# Patient Record
Sex: MALE | Race: ASIAN | NOT HISPANIC OR LATINO | Employment: UNEMPLOYED | URBAN - METROPOLITAN AREA
[De-identification: names, ages, dates, MRNs, and addresses within clinical notes are randomized per-mention and may not be internally consistent; named-entity substitution may affect disease eponyms.]

---

## 2017-07-12 ENCOUNTER — OFFICE VISIT - HEALTHEAST (OUTPATIENT)
Dept: FAMILY MEDICINE | Facility: CLINIC | Age: 52
End: 2017-07-12

## 2017-07-12 DIAGNOSIS — R19.5 HEME POSITIVE STOOL: ICD-10-CM

## 2017-07-12 DIAGNOSIS — F43.21 ADJUSTMENT DISORDER WITH DEPRESSED MOOD: ICD-10-CM

## 2017-07-12 DIAGNOSIS — F11.20 OPIOID TYPE DEPENDENCE (H): ICD-10-CM

## 2017-07-12 DIAGNOSIS — J47.9 BRONCHIECTASIS WITHOUT COMPLICATION (H): ICD-10-CM

## 2017-07-12 DIAGNOSIS — J44.9 COPD (CHRONIC OBSTRUCTIVE PULMONARY DISEASE) (H): ICD-10-CM

## 2017-07-12 ASSESSMENT — MIFFLIN-ST. JEOR: SCORE: 1124.5

## 2017-11-14 ENCOUNTER — COMMUNICATION - HEALTHEAST (OUTPATIENT)
Dept: FAMILY MEDICINE | Facility: CLINIC | Age: 52
End: 2017-11-14

## 2018-03-01 ENCOUNTER — OFFICE VISIT - HEALTHEAST (OUTPATIENT)
Dept: FAMILY MEDICINE | Facility: CLINIC | Age: 53
End: 2018-03-01

## 2018-03-01 DIAGNOSIS — Z00.00 ROUTINE GENERAL MEDICAL EXAMINATION AT A HEALTH CARE FACILITY: ICD-10-CM

## 2018-03-01 DIAGNOSIS — E78.2 MIXED HYPERLIPIDEMIA: ICD-10-CM

## 2018-03-01 DIAGNOSIS — Z23 NEED FOR VACCINATION: ICD-10-CM

## 2018-03-01 DIAGNOSIS — F11.20 OPIOID TYPE DEPENDENCE (H): ICD-10-CM

## 2018-03-01 DIAGNOSIS — F11.20 METHADONE MAINTENANCE THERAPY PATIENT (H): ICD-10-CM

## 2018-03-01 DIAGNOSIS — J44.9 COPD (CHRONIC OBSTRUCTIVE PULMONARY DISEASE) (H): ICD-10-CM

## 2018-03-01 DIAGNOSIS — F43.21 ADJUSTMENT DISORDER WITH DEPRESSED MOOD: ICD-10-CM

## 2018-03-01 LAB
ALBUMIN SERPL-MCNC: 3.5 G/DL (ref 3.5–5)
ALP SERPL-CCNC: 71 U/L (ref 45–120)
ALT SERPL W P-5'-P-CCNC: 48 U/L (ref 0–45)
ANION GAP SERPL CALCULATED.3IONS-SCNC: 11 MMOL/L (ref 5–18)
AST SERPL W P-5'-P-CCNC: 32 U/L (ref 0–40)
BILIRUB SERPL-MCNC: 0.5 MG/DL (ref 0–1)
BUN SERPL-MCNC: 18 MG/DL (ref 8–22)
CALCIUM SERPL-MCNC: 8.7 MG/DL (ref 8.5–10.5)
CHLORIDE BLD-SCNC: 100 MMOL/L (ref 98–107)
CHOLEST SERPL-MCNC: 200 MG/DL
CO2 SERPL-SCNC: 28 MMOL/L (ref 22–31)
CREAT SERPL-MCNC: 0.88 MG/DL (ref 0.7–1.3)
ERYTHROCYTE [DISTWIDTH] IN BLOOD BY AUTOMATED COUNT: 11.4 % (ref 11–14.5)
FASTING STATUS PATIENT QL REPORTED: NO
GFR SERPL CREATININE-BSD FRML MDRD: >60 ML/MIN/1.73M2
GLUCOSE BLD-MCNC: 173 MG/DL (ref 70–125)
HCT VFR BLD AUTO: 43.4 % (ref 40–54)
HDLC SERPL-MCNC: 30 MG/DL
HGB BLD-MCNC: 14.3 G/DL (ref 14–18)
LDLC SERPL CALC-MCNC: 124 MG/DL
MCH RBC QN AUTO: 30.6 PG (ref 27–34)
MCHC RBC AUTO-ENTMCNC: 32.9 G/DL (ref 32–36)
MCV RBC AUTO: 93 FL (ref 80–100)
PLATELET # BLD AUTO: 145 THOU/UL (ref 140–440)
PMV BLD AUTO: 7.2 FL (ref 7–10)
POTASSIUM BLD-SCNC: 3.5 MMOL/L (ref 3.5–5)
PROT SERPL-MCNC: 7.5 G/DL (ref 6–8)
PSA SERPL-MCNC: 0.5 NG/ML (ref 0–3.5)
RBC # BLD AUTO: 4.67 MILL/UL (ref 4.4–6.2)
SODIUM SERPL-SCNC: 139 MMOL/L (ref 136–145)
TRIGL SERPL-MCNC: 228 MG/DL
WBC: 6.6 THOU/UL (ref 4–11)

## 2018-03-01 ASSESSMENT — MIFFLIN-ST. JEOR: SCORE: 1138.11

## 2018-03-05 ENCOUNTER — COMMUNICATION - HEALTHEAST (OUTPATIENT)
Dept: FAMILY MEDICINE | Facility: CLINIC | Age: 53
End: 2018-03-05

## 2018-03-08 ENCOUNTER — OFFICE VISIT - HEALTHEAST (OUTPATIENT)
Dept: FAMILY MEDICINE | Facility: CLINIC | Age: 53
End: 2018-03-08

## 2018-03-08 DIAGNOSIS — E78.1 HYPERTRIGLYCERIDEMIA: ICD-10-CM

## 2018-03-08 DIAGNOSIS — R73.9 HYPERGLYCEMIA: ICD-10-CM

## 2018-03-08 LAB
FASTING STATUS PATIENT QL REPORTED: YES
GLUCOSE BLD-MCNC: 95 MG/DL (ref 70–99)
HBA1C MFR BLD: 5.4 % (ref 3.5–6)

## 2018-04-04 ENCOUNTER — OFFICE VISIT - HEALTHEAST (OUTPATIENT)
Dept: FAMILY MEDICINE | Facility: CLINIC | Age: 53
End: 2018-04-04

## 2018-04-04 DIAGNOSIS — J47.9 BRONCHIECTASIS WITHOUT ACUTE EXACERBATION (H): ICD-10-CM

## 2018-04-04 DIAGNOSIS — J44.9 COPD (CHRONIC OBSTRUCTIVE PULMONARY DISEASE) (H): ICD-10-CM

## 2018-04-04 DIAGNOSIS — F17.210 DEPENDENCE ON NICOTINE FROM CIGARETTES: ICD-10-CM

## 2018-04-04 DIAGNOSIS — J47.9 BRONCHIECTASIS WITHOUT COMPLICATION (H): ICD-10-CM

## 2018-04-04 DIAGNOSIS — R19.5 NONSPECIFIC ABNORMAL FINDING IN STOOL CONTENTS: ICD-10-CM

## 2018-04-04 DIAGNOSIS — F11.20 OPIOID TYPE DEPENDENCE (H): ICD-10-CM

## 2019-04-22 ENCOUNTER — COMMUNICATION - HEALTHEAST (OUTPATIENT)
Dept: FAMILY MEDICINE | Facility: CLINIC | Age: 54
End: 2019-04-22

## 2019-04-22 DIAGNOSIS — J44.9 COPD (CHRONIC OBSTRUCTIVE PULMONARY DISEASE) (H): ICD-10-CM

## 2019-04-22 DIAGNOSIS — J47.9 BRONCHIECTASIS WITHOUT COMPLICATION (H): ICD-10-CM

## 2019-11-18 ENCOUNTER — COMMUNICATION - HEALTHEAST (OUTPATIENT)
Dept: FAMILY MEDICINE | Facility: CLINIC | Age: 54
End: 2019-11-18

## 2019-12-02 ENCOUNTER — COMMUNICATION - HEALTHEAST (OUTPATIENT)
Dept: FAMILY MEDICINE | Facility: CLINIC | Age: 54
End: 2019-12-02

## 2020-01-02 ENCOUNTER — OFFICE VISIT - HEALTHEAST (OUTPATIENT)
Dept: FAMILY MEDICINE | Facility: CLINIC | Age: 55
End: 2020-01-02

## 2020-01-02 DIAGNOSIS — Q76.2 CONGENITAL SPONDYLOLYSIS, LUMBOSACRAL REGION: ICD-10-CM

## 2020-01-02 DIAGNOSIS — F11.20 METHADONE MAINTENANCE THERAPY PATIENT (H): ICD-10-CM

## 2020-01-02 DIAGNOSIS — M54.40 CHRONIC MIDLINE LOW BACK PAIN WITH SCIATICA, SCIATICA LATERALITY UNSPECIFIED: ICD-10-CM

## 2020-01-02 DIAGNOSIS — J47.9 BRONCHIECTASIS WITHOUT ACUTE EXACERBATION (H): ICD-10-CM

## 2020-01-02 DIAGNOSIS — J44.9 COPD (CHRONIC OBSTRUCTIVE PULMONARY DISEASE) (H): ICD-10-CM

## 2020-01-02 DIAGNOSIS — J47.9 BRONCHIECTASIS WITHOUT COMPLICATION (H): ICD-10-CM

## 2020-01-02 DIAGNOSIS — F11.20 UNCOMPLICATED OPIOID DEPENDENCE (H): ICD-10-CM

## 2020-01-02 DIAGNOSIS — G89.29 CHRONIC MIDLINE LOW BACK PAIN WITH SCIATICA, SCIATICA LATERALITY UNSPECIFIED: ICD-10-CM

## 2020-01-02 ASSESSMENT — MIFFLIN-ST. JEOR: SCORE: 1138.11

## 2020-01-08 ENCOUNTER — HOSPITAL ENCOUNTER (OUTPATIENT)
Dept: PHYSICAL MEDICINE AND REHAB | Facility: CLINIC | Age: 55
Discharge: HOME | End: 2020-01-08
Attending: FAMILY MEDICINE

## 2020-01-08 DIAGNOSIS — M54.42 CHRONIC BILATERAL LOW BACK PAIN WITH BILATERAL SCIATICA: ICD-10-CM

## 2020-01-08 DIAGNOSIS — G89.29 CHRONIC BILATERAL LOW BACK PAIN WITH BILATERAL SCIATICA: ICD-10-CM

## 2020-01-08 DIAGNOSIS — M54.41 CHRONIC BILATERAL LOW BACK PAIN WITH BILATERAL SCIATICA: ICD-10-CM

## 2020-01-08 DIAGNOSIS — M54.16 LUMBAR RADICULAR PAIN: ICD-10-CM

## 2020-01-08 ASSESSMENT — MIFFLIN-ST. JEOR: SCORE: 1138.11

## 2020-02-06 ENCOUNTER — COMMUNICATION - HEALTHEAST (OUTPATIENT)
Dept: PHYSICAL MEDICINE AND REHAB | Facility: CLINIC | Age: 55
End: 2020-02-06

## 2020-03-02 ENCOUNTER — COMMUNICATION - HEALTHEAST (OUTPATIENT)
Dept: PHYSICAL MEDICINE AND REHAB | Facility: CLINIC | Age: 55
End: 2020-03-02

## 2020-03-02 DIAGNOSIS — M54.42 CHRONIC BILATERAL LOW BACK PAIN WITH BILATERAL SCIATICA: ICD-10-CM

## 2020-03-02 DIAGNOSIS — G89.29 CHRONIC BILATERAL LOW BACK PAIN WITH BILATERAL SCIATICA: ICD-10-CM

## 2020-03-02 DIAGNOSIS — M54.41 CHRONIC BILATERAL LOW BACK PAIN WITH BILATERAL SCIATICA: ICD-10-CM

## 2020-03-15 ENCOUNTER — HOSPITAL ENCOUNTER (OUTPATIENT)
Dept: MRI IMAGING | Facility: HOSPITAL | Age: 55
Discharge: HOME | End: 2020-03-15
Attending: PHYSICIAN ASSISTANT

## 2020-03-15 DIAGNOSIS — M54.41 CHRONIC BILATERAL LOW BACK PAIN WITH BILATERAL SCIATICA: ICD-10-CM

## 2020-03-15 DIAGNOSIS — M54.42 CHRONIC BILATERAL LOW BACK PAIN WITH BILATERAL SCIATICA: ICD-10-CM

## 2020-03-15 DIAGNOSIS — G89.29 CHRONIC BILATERAL LOW BACK PAIN WITH BILATERAL SCIATICA: ICD-10-CM

## 2021-01-04 ENCOUNTER — COMMUNICATION - HEALTHEAST (OUTPATIENT)
Dept: FAMILY MEDICINE | Facility: CLINIC | Age: 56
End: 2021-01-04

## 2021-01-04 DIAGNOSIS — J47.9 BRONCHIECTASIS WITHOUT COMPLICATION (H): ICD-10-CM

## 2021-01-04 DIAGNOSIS — J44.9 COPD (CHRONIC OBSTRUCTIVE PULMONARY DISEASE) (H): ICD-10-CM

## 2021-01-06 ENCOUNTER — OFFICE VISIT - HEALTHEAST (OUTPATIENT)
Dept: FAMILY MEDICINE | Facility: CLINIC | Age: 56
End: 2021-01-06

## 2021-01-06 DIAGNOSIS — E78.2 MIXED HYPERLIPIDEMIA: ICD-10-CM

## 2021-01-06 DIAGNOSIS — J47.9 BRONCHIECTASIS WITHOUT COMPLICATION (H): ICD-10-CM

## 2021-01-06 DIAGNOSIS — M54.41 CHRONIC BILATERAL LOW BACK PAIN WITH BILATERAL SCIATICA: ICD-10-CM

## 2021-01-06 DIAGNOSIS — F43.21 ADJUSTMENT DISORDER WITH DEPRESSED MOOD: ICD-10-CM

## 2021-01-06 DIAGNOSIS — F17.210 CIGARETTE NICOTINE DEPENDENCE WITHOUT COMPLICATION: ICD-10-CM

## 2021-01-06 DIAGNOSIS — J44.9 COPD (CHRONIC OBSTRUCTIVE PULMONARY DISEASE) (H): ICD-10-CM

## 2021-01-06 DIAGNOSIS — G89.29 CHRONIC BILATERAL LOW BACK PAIN WITH BILATERAL SCIATICA: ICD-10-CM

## 2021-01-06 DIAGNOSIS — M54.42 CHRONIC BILATERAL LOW BACK PAIN WITH BILATERAL SCIATICA: ICD-10-CM

## 2021-01-06 RX ORDER — IPRATROPIUM BROMIDE AND ALBUTEROL 20; 100 UG/1; UG/1
SPRAY, METERED RESPIRATORY (INHALATION)
Qty: 1 INHALER | Refills: 5 | Status: SHIPPED | OUTPATIENT
Start: 2021-01-06 | End: 2022-04-05

## 2021-01-06 RX ORDER — ALBUTEROL SULFATE 0.63 MG/3ML
SOLUTION RESPIRATORY (INHALATION)
Qty: 75 ML | Refills: 1 | Status: SHIPPED | OUTPATIENT
Start: 2021-01-06 | End: 2021-09-15

## 2021-01-06 RX ORDER — MOMETASONE FUROATE AND FORMOTEROL FUMARATE DIHYDRATE 100; 5 UG/1; UG/1
2 AEROSOL RESPIRATORY (INHALATION) EVERY 12 HOURS
Qty: 1 INHALER | Refills: 5 | Status: SHIPPED | OUTPATIENT
Start: 2021-01-06 | End: 2022-04-05

## 2021-05-28 NOTE — TELEPHONE ENCOUNTER
RN cannot approve Refill Request    RN can NOT refill this medication overdue for office visits and/or labs.    Wong Escalante, [facility] Triage/Med Refill 4/23/2019    Requested Prescriptions   Pending Prescriptions Disp Refills     albuterol (ACCUNEB) 0.63 mg/3 mL nebulizer solution [Pharmacy Med Name: ALBUTEROL SUL 0.63 MG/3 ML SOL] 75 mL 2     Sig: NEBULIZE 1 VIAL 3 TIMES A DAY AS NEEDED FOR WHEEZING       Albuterol/Levalbuterol Refill Protocol Failed - 4/22/2019  5:26 PM        Failed - PCP or prescribing provider visit in last year     Last office visit with prescriber/PCP: 4/4/2018 Juan Montez MD OR same dept: Visit date not found OR same specialty: 4/4/2018 Juan Montez MD Last physical: Visit date not found       Next appt within 3 mo: Visit date not found  Next physical within 3 mo: Visit date not found  Prescriber OR PCP: Juan Montez MD  Last diagnosis associated with med order: 1. Bronchiectasis without complication (H)  - albuterol (ACCUNEB) 0.63 mg/3 mL nebulizer solution [Pharmacy Med Name: ALBUTEROL SUL 0.63 MG/3 ML SOL]; NEBULIZE 1 VIAL 3 TIMES A DAY AS NEEDED FOR WHEEZING  Dispense: 75 mL; Refill: 2    2. COPD (chronic obstructive pulmonary disease) (H)  - albuterol (ACCUNEB) 0.63 mg/3 mL nebulizer solution [Pharmacy Med Name: ALBUTEROL SUL 0.63 MG/3 ML SOL]; NEBULIZE 1 VIAL 3 TIMES A DAY AS NEEDED FOR WHEEZING  Dispense: 75 mL; Refill: 2    If protocol passes may refill for 6 months if within 3 months of last provider visit (or a total of 9 months). If patient requesting >1 inhaler per month refill x 6 months and have patient make appointment with provider.

## 2021-05-31 ENCOUNTER — RECORDS - HEALTHEAST (OUTPATIENT)
Dept: ADMINISTRATIVE | Facility: CLINIC | Age: 56
End: 2021-05-31

## 2021-05-31 VITALS — WEIGHT: 106 LBS | HEIGHT: 60 IN | BODY MASS INDEX: 20.81 KG/M2

## 2021-06-01 ENCOUNTER — RECORDS - HEALTHEAST (OUTPATIENT)
Dept: ADMINISTRATIVE | Facility: CLINIC | Age: 56
End: 2021-06-01

## 2021-06-01 VITALS — HEIGHT: 60 IN | BODY MASS INDEX: 21.4 KG/M2 | WEIGHT: 109 LBS

## 2021-06-01 VITALS — WEIGHT: 106 LBS | BODY MASS INDEX: 21.78 KG/M2

## 2021-06-01 VITALS — WEIGHT: 110 LBS | BODY MASS INDEX: 22.6 KG/M2

## 2021-06-02 ENCOUNTER — RECORDS - HEALTHEAST (OUTPATIENT)
Dept: ADMINISTRATIVE | Facility: CLINIC | Age: 56
End: 2021-06-02

## 2021-06-03 NOTE — TELEPHONE ENCOUNTER
Unable to LM at [phone] due to number not in service . Sending letter out and postponing task out to 2 weeks and will try again if an appointment hasn't been made.

## 2021-06-03 NOTE — TELEPHONE ENCOUNTER
Dr. Montez received forms from '[facility] and per the doctor, patient needs a visit. Please call the patient for an appointment. Thanks.

## 2021-06-03 NOTE — TELEPHONE ENCOUNTER
Unable to LM at [phone] due to number not in service . Sending letter out and postponing task out to 2 weeks and will try again if an appointment hasn't been made.

## 2021-06-04 VITALS
WEIGHT: 109 LBS | RESPIRATION RATE: 18 BRPM | HEIGHT: 60 IN | BODY MASS INDEX: 21.4 KG/M2 | HEART RATE: 98 BPM | SYSTOLIC BLOOD PRESSURE: 124 MMHG | DIASTOLIC BLOOD PRESSURE: 84 MMHG

## 2021-06-04 VITALS — BODY MASS INDEX: 21.4 KG/M2 | WEIGHT: 109 LBS | HEIGHT: 60 IN

## 2021-06-04 NOTE — TELEPHONE ENCOUNTER
Left message #3 at [phone]. We have made several attempts to contact patient by phone and letter to schedule an appointment. Unfortunately, our calls have not been returned and we were unable to schedule. At this time, we will no longer make an attempt to schedule this appointment. Completing task.

## 2021-06-04 NOTE — PROGRESS NOTES
Cough one month and wheeze all when meds ran out.    Leg one month.  No injury  bilat r more than l   Deep ache.  Uncomfortable    Cannot do anything.    Skin is not tender.   Stretch leg relieves a little.  Lumbar and anterior thighs to ankle medial     Not to toes.    Not progressive.    Urine stool control is ok.    No worse with motion or lift.  Or stand or walk.    No fever  No rash      Endorses hypesthesia right post calf.   Left also.    Still smoking 1-2 cig per day    Stopped opium but on methadone program since 2004         OBJECTIVE:   Vitals:    01/02/20 1334   BP: 124/84   Pulse: 98   Resp: 18      Wt is noted.  No diaphoresis  Eyes: nl eom, anicteric   External ears, nose: nl    Neck: nl nodes, supple, thyroid normal   Lungs: decreased sounds.   Some minimal wheeze   Heart: regular rhythm  Abd: soft nontender     No cva (renal) tenderness  Neuro: no weakness  Skin no rash  Joints: uninflamed   No ketotic breath odor noted  Mental: euthymic  Ext: nontender calves   Gait: apparent pain left leg weight bearing  Body mass index is 22.39 kg/m .  ASSESSMENT/PLAN:    1. Bronchiectasis     2. Bronchiectasis without complication (H)  ipratropium-albuterol (COMBIVENT RESPIMAT)  mcg/actuation Mist inhaler    mometasone-formoterol (DULERA) 100-5 mcg/actuation HFAA inhaler    albuterol (ACCUNEB) 0.63 mg/3 mL nebulizer solution   3. COPD (chronic obstructive pulmonary disease) (H)  ipratropium-albuterol (COMBIVENT RESPIMAT)  mcg/actuation Mist inhaler    mometasone-formoterol (DULERA) 100-5 mcg/actuation HFAA inhaler    albuterol (ACCUNEB) 0.63 mg/3 mL nebulizer solution   4. Methadone maintenance therapy patient (H)     5. Chronic midline low back pain with sciatica, sciatica laterality unspecified  Ambulatory referral to Spine Care   6. Congenital Lumbosacral Spondylolysis  Ambulatory referral to Spine Care   7. Uncomplicated opioid dependence (H)       Chronic issues stable/ same treatment  Current  Outpatient Medications on File Prior to Visit   Medication Sig Dispense Refill     acetaminophen (TYLENOL) 500 MG tablet Take 500-1,000 mg by mouth. Up to 4 times daily       hydrocortisone 0.5 % cream Apply topically.       ibuprofen (ADVIL,MOTRIN) 600 MG tablet Take 600 mg by mouth.       methocarbamol (ROBAXIN) 750 MG tablet Take 750-1,500 mg by mouth 2 (two) times a day as needed.       triamcinolone (KENALOG) 0.1 % cream Twice daily to persisting areas 45 g 0     [DISCONTINUED] albuterol (ACCUNEB) 0.63 mg/3 mL nebulizer solution NEBULIZE 1 VIAL 3 TIMES A DAY AS NEEDED FOR WHEEZING 75 mL 2     [DISCONTINUED] ipratropium-albuterol (COMBIVENT RESPIMAT)  mcg/actuation Mist inhaler Inhale 1 puff 2 (two) times a day. 1 Inhaler 11     [DISCONTINUED] mometasone-formoterol (DULERA) 100-5 mcg/actuation HFAA inhaler Inhale 2 puffs every 12 (twelve) hours. Intervals (Morning and evening) 1 Inhaler 11     No current facility-administered medications on file prior to visit.

## 2021-06-05 NOTE — TELEPHONE ENCOUNTER
Follow up call placed to check in 4 weeks after new patient consultation appointment with assistance of phuc  # [phone]. PSP requested a call to patient to check on status of his insurance so that treatment plan could be put into motion. Patient reports that the individual who is working on this for him is out of office until next week. They will be working on this again for him then. He is encouraged to call our clinic once he knows he has active insurance. Stated understanding.

## 2021-06-05 NOTE — PROGRESS NOTES
ASSESSMENT: Jonnie Latham is a 54 y.o. male with past medical history significant for history of opioid dependence, adjustment disorder with depressed mood, bronchiectasis, COPD, nicotine dependence who presents today for new patient evaluation of chronic and progressive bilateral low back pain with radiation into the bilateral lower extremities with associated numbness, tingling, subjective weakness.  Patient has not had any advanced imaging of the lumbar spine.  Patient states he had an x-ray in the past which showed a problem with his alignment.  He may have spondylolisthesis.  I suspect he may have lumbar spinal stenosis.  He endorses limited walking and standing tolerance with a positive shopping cart sign.  Patient was neurologically intact on exam.    REMI:38  Who 5:16    PLAN:  A shared decision making model was used.  The patient's values and choices were respected.  The following represents what was discussed and decided upon by the physician assistant and the patient.  A professional  is present for the visit.    1.  DIAGNOSTIC TESTS: I recommended an MRI lumbar spine for further evaluation.  Patient states that he does not currently have health insurance.  He thinks he will have active insurance within the next 1 month.  I would recommend an MRI lumbar spine at that time.    2.  PHYSICAL THERAPY: I also recommend physical therapy.  Again, patient would like to begin physical therapy once his health insurance is active.    3.  MEDICATIONS:    -I provided a prescription for naproxen 500 mg twice daily as needed.  -Patient can continue taking Tylenol as needed.  -Patient uses methadone 100 mg daily.  -Would not provide any additional medications that could be sedating since patient is on methadone.    4.  INTERVENTIONS: No interventions were ordered.  Patient may benefit from interventional pain management if he fails to improve with conservative treatment, depending on the results of the  MRI.    5.  PATIENT EDUCATION: Patient is in agreement with the above plan.  All questions were answered.    6.  FOLLOW-UP:   A nurse will call the patient in 4 weeks to check in.  If his health insurance is active at that time, I would recommend the MRI lumbar spine and a referral to physical therapy.  He may call our clinic before that if he gets his health insurance earlier.  If he has any other questions or concerns, he should not hesitate to call.      SUBJECTIVE:  Jonnie Latham  Is a 54 y.o. male who presents today in consultation request of Dr. Montez for new patient evaluation of low back pain with radiation into bilateral lower extremities with associated numbness, tingling, and weakness.  Patient reports that he began to have back pain at age 3 or 4.  He states that he fell off of the back of a horse.  He states that he injured his back and hit his head at the time.  He did not seek any medical attention so he does not know if he suffered any fractures.  He states that he has had back and leg pain ever since.  It has been getting progressively worse, especially over the past 2 months.    Patient complains of bilateral low back pain.  Pain spans across low back and a broadband distribution at the belt line.  Both sides are equally affected.  Pain radiates into the buttocks, the posterior thighs, into the posterior calves to the ankles.  Patient reports that back pain is more severe than leg pain.  He describes the pain as shooting and pounding.  He states that there is numbness and tingling in the same distribution as his pain.  He states that his legs feel weak.  He describes this as a heavy sensation in his legs with walking.  Pain is aggravated with walking more than half a block, standing more than 15 to 20 minutes.  Pain is also aggravated with prolonged standing, bending, and twisting.  Patient reports the pain is alleviated with lying down.  He has some relief of his pain if he sits, but not too long.   "He also notes that he can walk further if he is able to lean forward, such as on a shopping cart.  Patient denies any loss of bowel or bladder control.  Denies any recent fevers, chills, or sweats.    Patient did physical therapy for his lower back about 15 years ago.  He tried chiropractic treatment within the past 6 months which provided little relief of his pain.  He has never had any spine injections or spine surgery.  States that he did have an x-ray once which indicated that the alignment in his spine was \"off.\"  Patient states that he is using Tylenol and ibuprofen as needed.  He states that he also uses methocarbamol 750 mg as needed, but I do not see that this was recently prescribed for him.  Patient is on a methadone program for opioid dependence.  He states he takes 100 mg daily.    Current Outpatient Medications on File Prior to Visit   Medication Sig Dispense Refill     acetaminophen (TYLENOL) 500 MG tablet Take 500-1,000 mg by mouth. Up to 4 times daily       albuterol (ACCUNEB) 0.63 mg/3 mL nebulizer solution NEBULIZE 1 VIAL 3 TIMES A DAY AS NEEDED FOR WHEEZING 75 mL 11     hydrocortisone 0.5 % cream Apply topically.       ibuprofen (ADVIL,MOTRIN) 600 MG tablet Take 600 mg by mouth.       ipratropium-albuterol (COMBIVENT RESPIMAT)  mcg/actuation Mist inhaler Inhale 1 puff 2 (two) times a day. 1 Inhaler 11     methocarbamol (ROBAXIN) 750 MG tablet Take 750-1,500 mg by mouth 2 (two) times a day as needed.       mometasone-formoterol (DULERA) 100-5 mcg/actuation HFAA inhaler Inhale 2 puffs every 12 (twelve) hours. Intervals (Morning and evening) 1 Inhaler 11     triamcinolone (KENALOG) 0.1 % cream Twice daily to persisting areas 45 g 0       No Known Allergies    Patient Active Problem List   Diagnosis     Mixed Hyperlipoproteinemia     Dermatitis     Metabolic Tests Nonspecific Abnormal Serum Enzyme Levels     Opioid Dependence     Adjustment Disorder With Depressed Mood     Lower Back Pain " Chronic     Bronchiectasis     Congenital Lumbosacral Spondylolysis     Hemoccult Positive Stool     COPD (chronic obstructive pulmonary disease) (H)     Methadone maintenance therapy patient (H)     Dependence on nicotine from cigarettes       Surgical history: None    Family history: None    Social history: Patient is  from his wife.  He lives with his son and daughter-in-law.  He is unemployed.  He smokes 1 cigarette/day.  He denies alcohol use.  Denies illicit drug use.          ROS: Positive for shortness of breath, joint pain, muscle pain.  Specifically negative for bowel/bladder dysfunction, fevers,chills, appetite changes, unexplained weight loss.   Otherwise 13 systems reviewed are negative.  Please see the patient's intake questionnaire from today for details.      OBJECTIVE:  PHYSICAL EXAMINATION:    CONSTITUTIONAL:  Vital signs as above.  No acute distress.  The patient is well nourished and well groomed.  PSYCHIATRIC:  The patient is awake, alert, oriented to person, place, time and answering questions appropriately with clear speech.    HEENT: Normocephalic, atraumatic.  Sclera clear.  Neck is supple.  SKIN:  Skin over the face, bilateral lower extremities, and posterior torso is clean, dry, intact without rashes.    GAIT:  Gait is non-antalgic.  Patient demonstrates difficulty with heel walking and toe walking bilaterally due to increased low back pain.  STANDING EXAMINATION: Tender to palpation bilateral lower lumbar paraspinous muscles.  Lumbar flexion and extension are both mildly restricted.  Lumbar facet loading maneuvers increased low back pain bilaterally.  MUSCLE STRENGTH:  The patient has 5/5 strength for the bilateral hip flexors, knee flexors/extensors, ankle dorsiflexors/plantar flexors, great toe extensors, ankle evertors/invertors.  NEUROLOGICAL:  2/4 patellar, and achilles reflexes bilaterally.  Negative Babinski's bilaterally.  No ankle clonus bilaterally. Sensation to light  touch is intact in the bilateral L4, L5, and S1 dermatomes.  VASCULAR:  2/4 dorsalis pedis pulses bilaterally.  Bilateral lower extremities are warm.  There is no pitting edema of the bilateral lower extremities.  ABDOMINAL:  Soft, non-distended, non-tender throughout all quadrants.  No pulsatile mass palpated in the left lower quadrant.  LYMPH NODES:  No palpable or tender inguinal lymph nodes.  MUSCULOSKELETAL: Straight leg raise reproduces buttock pain bilaterally.

## 2021-06-06 NOTE — TELEPHONE ENCOUNTER
Patient calling clinic to inform of insurance now active. Conference call with [facility]  #[phone]. Chart reviewed. Explained PSP would be notified that he now has active insurance. Per initial office visit note, PSP was planning to order an MRI and PT. Explained she would be notified of the active insurance, orders placed (he wants to go to [facility]), and he would be contacted to schedule both. Stated understanding.     Transferred to scheduling to update insurance information.

## 2021-06-11 NOTE — PROGRESS NOTES
Hx positive HC and decline colonoscopy.  Says wt is stable.  Declines today    Hx bronchiectasis and inconsistent med use in past.  Off meds long while.  Presents for cough and wheeze for two wks.  Denies fever.  Coughs mucous which is same.  No blood.  Needs refills for neb.  Uses bid prn    Hx opiate abuse and on methadone program.  Still. No constipation    On a sleep depression med.  Seroquel. Unknown dose.      ROS: as noted above    OBJECTIVE:   Vitals:    07/12/17 1041   BP: 102/80   Pulse: 80   Resp: 16   Temp: 97.8  F (36.6  C)   SpO2: 95%      Head: atraumatic   Eyes: nl eom, anicteric   Ears: nl external ears   Neck: nl nodes, supple   Lungs: diffuse adventitious sounds without wheeze or rales  Heart: regular rhythm  Back: no tenderness  Abd: soft nontender   Joints: uninflamed   Ext: nontender calves   Mental: euthymic  Neuro: no weakness  Gait: normal  Thin  Wt down one pound per year since 2015  ASSESSMENT/PLAN:    1. Opioid type dependence     2. Adjustment disorder with depressed mood     3. Bronchiectasis without complication  mometasone-formoterol (DULERA) 100-5 mcg/actuation HFAA inhaler    ipratropium-albuterol (COMBIVENT RESPIMAT)  mcg/actuation Mist inhaler    albuterol (ACCUNEB) 0.63 mg/3 mL nebulizer solution   4. COPD (chronic obstructive pulmonary disease)  albuterol (ACCUNEB) 0.63 mg/3 mL nebulizer solution   5. Heme positive stool       Chronic issues stable/ same treatment.  Declines colonoscopy for now and hx of positive HC

## 2021-06-14 NOTE — PROGRESS NOTES
Jonnie Latham is a 55 y.o. male who is being evaluated via a billable telephone visit.      What phone number would you like to be contacted at? [phone]   How would you like to obtain your AVS? AVS Preference: Mail a copy.  Assessment & Plan     Jonnie was seen today for follow-up.    Diagnoses and all orders for this visit:    Mixed Hyperlipoproteinemia    Bronchiectasis without complication (H)  -     mometasone-formoterol (DULERA) 100-5 mcg/actuation HFAA inhaler; Inhale 2 puffs every 12 (twelve) hours. Intervals (Morning and evening)  -     albuterol (ACCUNEB) 0.63 mg/3 mL nebulizer solution; Use nebulizer four times a day as needed for wheezing  -     ipratropium-albuteroL (COMBIVENT RESPIMAT)  mcg/actuation Mist inhaler; TAKE 1 PUFF BY MOUTH TWICE A DAY    COPD (chronic obstructive pulmonary disease) (H)  -     mometasone-formoterol (DULERA) 100-5 mcg/actuation HFAA inhaler; Inhale 2 puffs every 12 (twelve) hours. Intervals (Morning and evening)  -     albuterol (ACCUNEB) 0.63 mg/3 mL nebulizer solution; Use nebulizer four times a day as needed for wheezing  -     ipratropium-albuteroL (COMBIVENT RESPIMAT)  mcg/actuation Mist inhaler; TAKE 1 PUFF BY MOUTH TWICE A DAY    Adjustment disorder with depressed mood    Cigarette nicotine dependence without complication  -     nicotine (NICODERM CQ) 7 mg/24 hr; Place 1 patch on the skin daily.    Chronic bilateral low back pain with bilateral sciatica      COPD, refill on meds as outlined seems to be fairly well controlled.  No active infectious component to the bronchiectasis.    Continue on diet for hyperlipidemia check lipids down the line.    Depression controlled off medicine    Ongoing nicotine use needs to stop altogether he is on a 7 mg patch daily for the next month.    Chronic low back pain presently without any radicular component stable  Review of external notes as documented above Spine care consult from 1/8/2020 reviewed, with Madeleine Garcia,  PA          Diagnosis or treatment significantly limited by social determinants of health -non-English-speaking immigrant, poor socioeconomic status    30 minutes spent on the date of the encounter doing chart review, history and exam, documentation and further activities as noted above         Tobacco Cessation:   reports that he has been smoking cigarettes. He has never used smokeless tobacco.  Please see above discussion use NicoDerm    No follow-ups on file.    Mehdi Gee MD  [facility]    Subjective     Jonnie Latham is 55 y.o. and presents to clinic today for the following health issues   HPI   This patient had a virtual visit, telephone, due to the coronavirus pandemic.    Patient previously saw Dr. Montez     Patient is on Dulera and Combivent and albuterol nebs.    He has been controlling his breathing fairly well with this.    Also has history of COPD and bronchiectasis.    He does smoke.    We discussed smoking cessation.  He has been smoking 3 to 4 cigarettes a day will go on a 7 mg NicoDerm patch for the next month.  He knows he can smoke and be on the patch.    Also has mixed hyperlipidemia.  He has been on diet for that.  We will check that down the line.    Otherwise denies additional problems or issues.    No COVID-19 symptoms or exposure  Review of Systems  10 point review of systems positive as outlined above otherwise negative    Patient has a history of adjustment disorder with depressed mood.  Presently denies any active symptoms of depression is not on any medications.      Objective       Vitals:  No vitals were obtained today due to virtual visit.    Physical Exam  General: No acute distress    HEENT: Denies any nasal congestion or sore throat.  No visual changes    Neck nontender    Lungs: He gets intermittent wheezing but at rest presently not having any labored breathing or wheezing.    Heart: No palpitations rapid heart rate.  Has not had any  hypertension    Abdomen nontender.    Lower extremities without edema.    Skin is normal no rashes.    Patient has a history of adjustment disorder with depressed mood use no longer on any medication he feels like he has been doing okay regarding that          Phone call duration: 21 minutes

## 2021-06-14 NOTE — TELEPHONE ENCOUNTER
Patient has a future Telephone Visit appointment on 01/06/2021 @ 11:20AM with . Completing task.

## 2021-06-16 PROBLEM — F17.210 DEPENDENCE ON NICOTINE FROM CIGARETTES: Status: ACTIVE | Noted: 2018-04-04

## 2021-06-16 NOTE — PROGRESS NOTES
Subjective: Patient comes in for follow-up he had a physical a week ago had elevated blood sugar 173    I reviewed additional labs his triglycerides 228 HDL 30    Other abnormal labs ALT was slightly up at 48    Patient continues on the methadone as well as his medications for his breathing.    Other labs looked fine.    Tobacco status: He  reports that he has been smoking Cigarettes.  He has never used smokeless tobacco.    Patient Active Problem List    Diagnosis Date Noted     COPD (chronic obstructive pulmonary disease) 01/19/2016     Methadone maintenance therapy patient 01/19/2016     Mixed Hyperlipoproteinemia      Dermatitis      Metabolic Tests Nonspecific Abnormal Serum Enzyme Levels      Opioid Dependence      Adjustment Disorder With Depressed Mood      Lower Back Pain Chronic      Bronchiectasis      Congenital Lumbosacral Spondylolysis      Hemoccult Positive Stool        Current Outpatient Prescriptions   Medication Sig Dispense Refill     albuterol (ACCUNEB) 0.63 mg/3 mL nebulizer solution Take 3 mL (0.63 mg total) by nebulization 3 (three) times a day as needed for wheezing. 3 mL prn     ipratropium-albuterol (COMBIVENT RESPIMAT)  mcg/actuation Mist inhaler Inhale 1 puff 2 (two) times a day. 1 Inhaler 3     methadone (DOLOPHINE) 10 mg/mL solution Take 95 mg by mouth.       mometasone-formoterol (DULERA) 100-5 mcg/actuation HFAA inhaler Inhale 2 puffs every 12 (twelve) hours. Intervals (Morning and evening) 1 Inhaler 3     acetaminophen (TYLENOL) 500 MG tablet Take 500-1,000 mg by mouth. Up to 4 times daily       hydrocortisone 0.5 % cream Apply topically.       ibuprofen (ADVIL,MOTRIN) 600 MG tablet Take 600 mg by mouth.       methocarbamol (ROBAXIN) 750 MG tablet Take 750-1,500 mg by mouth 2 (two) times a day as needed.       triamcinolone (KENALOG) 0.1 % cream Twice daily to persisting areas 45 g 0     No current facility-administered medications for this visit.        ROS:   Review of  systems negative other than as outlined above, denies any increased thirst increased urination.    Objective:    /88 (Patient Site: Left Arm, Patient Position: Sitting, Cuff Size: Adult Regular)  Pulse 84  Resp 16  Wt 106 lb (48.1 kg)  BMI 21.78 kg/m2  Body mass index is 21.78 kg/(m^2).      General appearance no acute distress vital signs were stable    Lungs are clear no rales or rhonchi heart was regular S1-S2 extremities without edema  Skin was normal no rash  Weight is at 106    Labs today fasting showed blood sugar 95 A1c 5.4    Results for orders placed or performed in visit on 03/08/18   Glucose   Result Value Ref Range    Glucose 95 70 - 99 mg/dL    Patient Fasting > 8hrs? Yes    Glycosylated Hemoglobin A1c   Result Value Ref Range    Hemoglobin A1c 5.4 3.5 - 6.0 %       Assessment:  1. Hyperglycemia  Glucose    Glycosylated Hemoglobin A1c   2. Hypertriglyceridemia       Hyperglycemia no evidence of diabetes    Hypertriglyceridemia, decrease carbohydrates    Low HDL increase physical activity    Plan: As discussed above    This transcription uses voice recognition software, which may contain typographical errors.

## 2021-06-16 NOTE — PROGRESS NOTES
Subjective: Patient comes in for evaluation is 52-year-old male comes in for a physical.  Refuses colonoscopy he has had a positive Hemoccult in the past, this is back in 2013.    Patient also declines flu shot.    He is on a methadone program for opioid dependence.  He had a urine drug screen consistent with the methadone on 2/21/2018, only that was positive.    His depression is stable he is off Seroquel    He continues on inhalers he did not bring those with or not sure if he is on Dulera or Combivent he does have a neb machine for albuterol.    He did have some wheezes bilaterally but fairly good air exchange O2 sat was 98% on room air    He is due for a pneumococcal 13, had previous PCV 23.  He did go ahead and get the pneumococcal 13 shot today.    Patient smokes 2 cigarettes a day no alcohol.    Otherwise denies additional problems or issues.  He has had a history of hyperlipidemia.  He is not on medication    Tobacco status: He  reports that he has been smoking Cigarettes.  He has never used smokeless tobacco.    Patient Active Problem List    Diagnosis Date Noted     COPD (chronic obstructive pulmonary disease) 01/19/2016     Methadone maintenance therapy patient 01/19/2016     Mixed Hyperlipoproteinemia      Dermatitis      Metabolic Tests Nonspecific Abnormal Serum Enzyme Levels      Opioid Dependence      Adjustment Disorder With Depressed Mood      Lower Back Pain Chronic      Bronchiectasis      Congenital Lumbosacral Spondylolysis      Hemoccult Positive Stool        Current Outpatient Prescriptions   Medication Sig Dispense Refill     acetaminophen (TYLENOL) 500 MG tablet Take 500-1,000 mg by mouth. Up to 4 times daily       albuterol (ACCUNEB) 0.63 mg/3 mL nebulizer solution Take 3 mL (0.63 mg total) by nebulization 3 (three) times a day as needed for wheezing. 3 mL prn     hydrocortisone 0.5 % cream Apply topically.       ibuprofen (ADVIL,MOTRIN) 600 MG tablet Take 600 mg by mouth.        "ipratropium-albuterol (COMBIVENT RESPIMAT)  mcg/actuation Mist inhaler Inhale 1 puff 2 (two) times a day. 1 Inhaler 3     methadone (DOLOPHINE) 10 mg/mL solution Take 95 mg by mouth.       methocarbamol (ROBAXIN) 750 MG tablet Take 750-1,500 mg by mouth 2 (two) times a day as needed.       mometasone-formoterol (DULERA) 100-5 mcg/actuation HFAA inhaler Inhale 2 puffs every 12 (twelve) hours. Intervals (Morning and evening) 1 Inhaler 3     triamcinolone (KENALOG) 0.1 % cream Twice daily to persisting areas 45 g 0     No current facility-administered medications for this visit.        ROS: 10 point review of systems negative other than as outlined above    Objective:    /78 (Patient Site: Right Arm, Patient Position: Sitting, Cuff Size: Adult Regular)  Pulse 74  Temp 97.5  F (36.4  C) (Oral)   Resp 20  Ht 4' 10.5\" (1.486 m)  Wt 109 lb (49.4 kg)  SpO2 98%  BMI 22.39 kg/m2  Body mass index is 22.39 kg/(m^2).    General appearance no acute distress    HEENT neck is supple no adenopathy oropharynx was clear pupils react normally extract movements full    Lungs with some expiratory wheezes otherwise unremarkable    Heart was regular S1-S2, rate at 74 respiratory rate 20 O2 sat 98%    Abdomen soft bowel sounds normal no guarding or rebound    No axillary inguinal adenopathy    Extremities without edema skin was normal    No rashes    Pulses were full    No rectal or genital exam was done per patient request.    Labs, blood sugar 173 triglyceride 228 HDL 30 normal PSA and CBC    Results for orders placed or performed in visit on 03/01/18   Comprehensive Metabolic Panel   Result Value Ref Range    Sodium 139 136 - 145 mmol/L    Potassium 3.5 3.5 - 5.0 mmol/L    Chloride 100 98 - 107 mmol/L    CO2 28 22 - 31 mmol/L    Anion Gap, Calculation 11 5 - 18 mmol/L    Glucose 173 (H) 70 - 125 mg/dL    BUN 18 8 - 22 mg/dL    Creatinine 0.88 0.70 - 1.30 mg/dL    GFR MDRD Af Amer >60 >60 mL/min/1.73m2    GFR MDRD Non " Af Amer >60 >60 mL/min/1.73m2    Bilirubin, Total 0.5 0.0 - 1.0 mg/dL    Calcium 8.7 8.5 - 10.5 mg/dL    Protein, Total 7.5 6.0 - 8.0 g/dL    Albumin 3.5 3.5 - 5.0 g/dL    Alkaline Phosphatase 71 45 - 120 U/L    AST 32 0 - 40 U/L    ALT 48 (H) 0 - 45 U/L   Lipid Cascade FASTING   Result Value Ref Range    Cholesterol 200 (H) <=199 mg/dL    Triglycerides 228 (H) <=149 mg/dL    HDL Cholesterol 30 (L) >=40 mg/dL    LDL Calculated 124 <=129 mg/dL    Patient Fasting > 8hrs? No    HM2(CBC w/o Differential)   Result Value Ref Range    WBC 6.6 4.0 - 11.0 thou/uL    RBC 4.67 4.40 - 6.20 mill/uL    Hemoglobin 14.3 14.0 - 18.0 g/dL    Hematocrit 43.4 40.0 - 54.0 %    MCV 93 80 - 100 fL    MCH 30.6 27.0 - 34.0 pg    MCHC 32.9 32.0 - 36.0 g/dL    RDW 11.4 11.0 - 14.5 %    Platelets 145 140 - 440 thou/uL    MPV 7.2 7.0 - 10.0 fL   PSA, Annual Screen (Prostatic-Specific Antigen)   Result Value Ref Range    PSA 0.5 0.0 - 3.5 ng/mL       Assessment:  1. Routine general medical examination at a health care facility  Comprehensive Metabolic Panel    Lipid Cascade FASTING    HM2(CBC w/o Differential)    PSA, Annual Screen (Prostatic-Specific Antigen)   2. Opioid type dependence     3. Adjustment disorder with depressed mood     4. COPD (chronic obstructive pulmonary disease)     5. Methadone maintenance therapy patient     6. Mixed Hyperlipoproteinemia     7. Need for vaccination  Pneumococcal conjugate vaccine 13-valent 6wks-17yrs; >50yrs     8.  Hyperglycemia with blood sugar 173 also elevated triglycerides    Plan: Patient will be contacted will have him come in for follow-up check fasting blood sugar and A1c.    Continue on methadone    Continue inhalers and nebs for his COPD.    PCV 13 shot given    As outlined above    This transcription uses voice recognition software, which may contain typographical errors.

## 2021-06-17 NOTE — PROGRESS NOTES
Chronic obstructive pulmonary disease denies productive cough or change in shortness of breath   follow up  Bronchiectasis    Ran out of meds  Alb for neb bid prn  3-4 days per week.  dulera 2 bid  2 months  Feels like it did nothing.  Stopped a few months ago.  combivent respimat one bid    On methadone program  No constipation    Hx positive hemoccult  No melena or heme    Walks for exercise.  Jogs around the block    Nicotine dependence.  Denies hemoptysis.   One cig per day    ROS: as noted above    OBJECTIVE:   Vitals:    04/04/18 1302   BP: 102/74   Pulse: 88   Resp: 20   SpO2: 98%      Wt is noted.  No diaphoresis  Eyes: nl eom, anicteric   External ears, nose: nl    Neck: nl nodes, supple, thyroid normal   Lungs: clear to ausc   Heart: regular rhythm  Abd: soft nontender     No cva (renal) tenderness  Neuro: no weakness  Skin no rash  Joints: uninflamed   No ketotic breath odor noted  Mental: euthymic  Ext: nontender calves   Gait: normal    Bmi:  22 stable    ASSESSMENT/PLAN:    Additional diagnoses and related orders:  1. Opioid type dependence     2. Bronchiectasis without acute exacerbation     3. COPD (chronic obstructive pulmonary disease)  mometasone-formoterol (DULERA) 100-5 mcg/actuation HFAA inhaler    ipratropium-albuterol (COMBIVENT RESPIMAT)  mcg/actuation Mist inhaler    albuterol (ACCUNEB) 0.63 mg/3 mL nebulizer solution   4. Hemoccult Positive Stool     5. Bronchiectasis without complication  mometasone-formoterol (DULERA) 100-5 mcg/actuation HFAA inhaler    ipratropium-albuterol (COMBIVENT RESPIMAT)  mcg/actuation Mist inhaler    albuterol (ACCUNEB) 0.63 mg/3 mL nebulizer solution   6. Dependence on nicotine from cigarettes       Ed on use.   Comb resp one then dulera two       Alb prn  Life style modification   Stop cig  Declines colonoscopy

## 2021-06-19 NOTE — LETTER
Letter by Juan Montez MD at      Author: Juan Montez MD Service: -- Author Type: --    Filed:  Encounter Date: 12/2/2019 Status: Signed         Jonnie VIVIAN Latham  [address] [place] [address]      December 2, 2019      Dear Jonnie,    As a valued [facility] patient, your healthcare needs are our priority.  Your health care team has determined that you are due for an appointment regarding your phycial.    To help prevent delays in your care, please call the [facility] at [phone].    We look forward to partnering with you to achieve optimal health and wellbeing.    Sincerely,  Your care team at [facility] and [facility]

## 2021-06-19 NOTE — LETTER
Letter by Juan Montez MD at      Author: Juan Montez MD Service: -- Author Type: --    Filed:  Encounter Date: 11/18/2019 Status: Signed         Jonnie VIVIAN Latham  [address] [place] [address]      November 18, 2019      Dear Jonnie,    As a valued [facility] patient, your healthcare needs are our priority.  Your health care team has determined that you are due for an appointment regarding your paperwork from [facility].    To help prevent delays in your care, please call the [facility] at [phone].    We look forward to partnering with you to achieve optimal health and wellbeing.    Sincerely,  Your care team at [facility] and [facility]

## 2021-09-15 DIAGNOSIS — J47.9 BRONCHIECTASIS WITHOUT COMPLICATION (H): ICD-10-CM

## 2021-09-15 DIAGNOSIS — J44.9 COPD (CHRONIC OBSTRUCTIVE PULMONARY DISEASE) (H): ICD-10-CM

## 2021-09-15 RX ORDER — ALBUTEROL SULFATE 0.63 MG/3ML
1 SOLUTION RESPIRATORY (INHALATION) 4 TIMES DAILY PRN
Qty: 75 ML | Refills: 1 | Status: SHIPPED | OUTPATIENT
Start: 2021-09-15 | End: 2022-03-25

## 2021-09-15 NOTE — TELEPHONE ENCOUNTER
"Prescription approved per [facility] Refill Protocol.      Last Written Prescription Date:  01/06/2021  Last Fill Quantity: 75mL,  # refills: 1   Last office visit provider:  01/06/2021     Requested Prescriptions   Pending Prescriptions Disp Refills     albuterol (ACCUNEB) 0.63 MG/3ML neb solution 75 mL 1     Sig: Take 3 mLs (0.63 mg) by nebulization 4 times daily as needed for shortness of breath / dyspnea or wheezing       Asthma Maintenance Inhalers - Anticholinergics Passed - 9/15/2021  8:32 AM        Passed - Patient is age 12 years or older        Passed - Recent (12 mo) or future (30 days) visit within the authorizing provider's specialty     Patient has had an office visit with the authorizing provider or a provider within the authorizing providers department within the previous 12 mos or has a future within next 30 days. See \"Patient Info\" tab in inbasket, or \"Choose Columns\" in Meds & Orders section of the refill encounter.              Passed - Medication is active on med list       Short-Acting Beta Agonist Inhalers Protocol  Passed - 9/15/2021  8:32 AM        Passed - Patient is age 12 or older        Passed - Recent (12 mo) or future (30 days) visit within the authorizing provider's specialty     Patient has had an office visit with the authorizing provider or a provider within the authorizing providers department within the previous 12 mos or has a future within next 30 days. See \"Patient Info\" tab in inbasket, or \"Choose Columns\" in Meds & Orders section of the refill encounter.              Passed - Medication is active on med list             Rola Srivastava RN 09/15/21 10:58 AM  "

## 2022-02-22 DIAGNOSIS — J44.9 COPD (CHRONIC OBSTRUCTIVE PULMONARY DISEASE) (H): ICD-10-CM

## 2022-02-22 DIAGNOSIS — J47.9 BRONCHIECTASIS WITHOUT COMPLICATION (H): ICD-10-CM

## 2022-02-22 NOTE — CONFIDENTIAL NOTE
The pt was requesting refill on Albuterol Sulfate solution, he needs an appt prior to being seen.     Please call and schedule appt and then send to refill pool.

## 2022-02-24 NOTE — TELEPHONE ENCOUNTER
Left message #1 at [phone]. Postponing task out to a week and will try again. If patient returns call back, please help patient schedule an appointment per message below. Thanks!

## 2022-03-10 NOTE — TELEPHONE ENCOUNTER
Left message #2 at [phone]. Sending letter out and postponing task out to 2 weeks and will try again if an appointment hasn't been made. If patient returns call back, please help patient schedule an appointment per message below. Thanks!

## 2022-03-25 RX ORDER — ALBUTEROL SULFATE 0.63 MG/3ML
1 SOLUTION RESPIRATORY (INHALATION) 4 TIMES DAILY PRN
Qty: 75 ML | Refills: 1 | Status: SHIPPED | OUTPATIENT
Start: 2022-03-25 | End: 2022-04-05

## 2022-03-25 NOTE — TELEPHONE ENCOUNTER
"Former patient of [facility] & has not established care with another provider.  Please assign refill request to covering provider per clinic standard process.    Routing refill request to provider for review/approval because:  Patient needs to be seen because it has been more than 1 year since last office visit.  No PCP    Last Written Prescription Date:  9/15/21  Last Fill Quantity: 75 ml,  # refills: 1   Last office visit provider:  1/6/21     Requested Prescriptions   Pending Prescriptions Disp Refills     albuterol (ACCUNEB) 0.63 MG/3ML neb solution 75 mL 1     Sig: Take 3 mLs (0.63 mg) by nebulization 4 times daily as needed for shortness of breath / dyspnea or wheezing       Asthma Maintenance Inhalers - Anticholinergics Failed - 2/22/2022  1:20 PM        Failed - Recent (12 mo) or future (30 days) visit within the authorizing provider's specialty     Patient has had an office visit with the authorizing provider or a provider within the authorizing providers department within the previous 12 mos or has a future within next 30 days. See \"Patient Info\" tab in inbasket, or \"Choose Columns\" in Meds & Orders section of the refill encounter.              Passed - Patient is age 12 years or older        Passed - Medication is active on med list       Short-Acting Beta Agonist Inhalers Protocol  Failed - 2/22/2022  1:20 PM        Failed - Recent (12 mo) or future (30 days) visit within the authorizing provider's specialty     Patient has had an office visit with the authorizing provider or a provider within the authorizing providers department within the previous 12 mos or has a future within next 30 days. See \"Patient Info\" tab in inbasket, or \"Choose Columns\" in Meds & Orders section of the refill encounter.              Passed - Patient is age 12 or older        Passed - Medication is active on med list             Jim Laura RN 03/25/22 2:37 PM  "

## 2022-03-25 NOTE — TELEPHONE ENCOUNTER
Patient has a future F2F appointment on 04/05/2022 at 11:40am  with Dr. Avila. Can provider send in refills to pharmacy?

## 2022-04-05 ENCOUNTER — OFFICE VISIT (OUTPATIENT)
Dept: FAMILY MEDICINE | Facility: CLINIC | Age: 57
End: 2022-04-05
Payer: COMMERCIAL

## 2022-04-05 VITALS
SYSTOLIC BLOOD PRESSURE: 127 MMHG | HEART RATE: 77 BPM | TEMPERATURE: 98.4 F | BODY MASS INDEX: 22.19 KG/M2 | RESPIRATION RATE: 16 BRPM | WEIGHT: 113 LBS | HEIGHT: 60 IN | DIASTOLIC BLOOD PRESSURE: 86 MMHG

## 2022-04-05 DIAGNOSIS — J47.9 BRONCHIECTASIS WITHOUT COMPLICATION (H): ICD-10-CM

## 2022-04-05 DIAGNOSIS — F11.20 UNCOMPLICATED OPIOID DEPENDENCE (H): ICD-10-CM

## 2022-04-05 DIAGNOSIS — Z87.891 PERSONAL HISTORY OF TOBACCO USE: ICD-10-CM

## 2022-04-05 DIAGNOSIS — J43.9 PULMONARY EMPHYSEMA, UNSPECIFIED EMPHYSEMA TYPE (H): Primary | ICD-10-CM

## 2022-04-05 DIAGNOSIS — Z12.11 SCREEN FOR COLON CANCER: ICD-10-CM

## 2022-04-05 PROCEDURE — 0052A COVID-19,PF,PFIZER (12+ YRS): CPT | Performed by: FAMILY MEDICINE

## 2022-04-05 PROCEDURE — 91305 COVID-19,PF,PFIZER (12+ YRS): CPT | Performed by: FAMILY MEDICINE

## 2022-04-05 PROCEDURE — 99214 OFFICE O/P EST MOD 30 MIN: CPT | Mod: 25 | Performed by: FAMILY MEDICINE

## 2022-04-05 RX ORDER — ALBUTEROL SULFATE 0.63 MG/3ML
1 SOLUTION RESPIRATORY (INHALATION) 4 TIMES DAILY PRN
Qty: 75 ML | Refills: 1 | Status: SHIPPED | OUTPATIENT
Start: 2022-04-05 | End: 2022-06-06

## 2022-04-05 RX ORDER — MOMETASONE FUROATE AND FORMOTEROL FUMARATE DIHYDRATE 100; 5 UG/1; UG/1
2 AEROSOL RESPIRATORY (INHALATION) EVERY 12 HOURS
Qty: 26 G | Refills: 3 | Status: SHIPPED | OUTPATIENT
Start: 2022-04-05 | End: 2022-06-06

## 2022-04-05 RX ORDER — IPRATROPIUM BROMIDE AND ALBUTEROL 20; 100 UG/1; UG/1
SPRAY, METERED RESPIRATORY (INHALATION)
Qty: 12 G | Refills: 3 | Status: SHIPPED | OUTPATIENT
Start: 2022-04-05 | End: 2022-06-06

## 2022-04-05 NOTE — PROGRESS NOTES
OFFICE VISIT - FAMILY MEDICINE     ASSESSMENT AND PLAN       ICD-10-CM    1. Pulmonary emphysema, unspecified emphysema type (H)  J43.9 mometasone-formoterol (DULERA) 100-5 MCG/ACT inhaler     albuterol (ACCUNEB) 0.63 MG/3ML neb solution     ipratropium-albuterol (COMBIVENT RESPIMAT)  MCG/ACT inhaler     Pulmonary Function Test   2. Bronchiectasis without complication (H)  J47.9 mometasone-formoterol (DULERA) 100-5 MCG/ACT inhaler     albuterol (ACCUNEB) 0.63 MG/3ML neb solution     ipratropium-albuterol (COMBIVENT RESPIMAT)  MCG/ACT inhaler     Pulmonary Function Test   3. Screen for colon cancer  Z12.11 SIRIA([employer])   4. Uncomplicated opioid dependence (H)  F11.20    Patient is here today primarily for refill of medication, he was asked to follow-up since he has not been seen for more than a year, he does use Combivent, Dulera and occasional albuterol nebulizer for COPD/emphysema, has been tolerating well no recent exacerbation or hospital admission.  As cutting down his smoking habit, currently smoke about 1 cigarette a day from a pack a days couple of years ago.  Medication was refilled.  Refer for PFTs.  Age-appropriate screening and immunizations discussed with patient, he did get his COVID-19 booster today, first dose was the [employer].  Screening lung and colon cancer with risk and benefit discussed.  New orders were signed.       CHIEF COMPLAINT   Recheck Medication       HPI   Jonnie Latham is a 56 year old male.  No Patient Care Coordination Note on file.    He is here today for medication refill, stating that he is using Combivent, Dulera and albuterol as needed for COPD, emphysema.  Medication seems to help him breathe better.  Last office visit with Dr. Gee was in January 2021.  Has been cutting down on smoking, he is stating that he is smoking about 1 cigarette a day, he was congratulated, last CT chest to screen for lung cancer was in 2010, report not available for  "review.  Denies any coughing up blood, no weight loss.  He does have history of opioid dependence is, is on methadone maintenance dose, has been getting up from the methadone clinic every 2 weeks, taking 110 mg every day.    Review of Systems As per HPI, otherwise negative.    OBJECTIVE   /86 (BP Location: Right arm, Patient Position: Sitting, Cuff Size: Adult Regular)   Pulse 77   Temp 98.4  F (36.9  C) (Temporal)   Resp 16   Ht 1.52 m (4' 11.84\")   Wt 51.3 kg (113 lb)   BMI 22.18 kg/m    Physical Exam  Constitutional:       Appearance: Normal appearance.   HENT:      Head: Normocephalic and atraumatic.   Cardiovascular:      Rate and Rhythm: Normal rate and regular rhythm.   Pulmonary:      Effort: Pulmonary effort is normal.      Breath sounds: Normal breath sounds.      Comments: Distant lung sounds bibasilarly  Musculoskeletal:      Cervical back: Normal range of motion and neck supple.   Neurological:      General: No focal deficit present.      Mental Status: He is alert and oriented to person, place, and time.   Psychiatric:         Behavior: Behavior normal.         Thought Content: Thought content normal.         Judgment: Judgment normal.         PFSH   No family history on file.  Social History     Socioeconomic History     Marital status:      Spouse name: Not on file     Number of children: Not on file     Years of education: Not on file     Highest education level: Not on file   Occupational History     Not on file   Tobacco Use     Smoking status: Current Every Day Smoker     Types: Cigarettes     Smokeless tobacco: Never Used     Tobacco comment: 1-2 cigs per day   Substance and Sexual Activity     Alcohol use: Not on file     Drug use: Not on file     Sexual activity: Not on file   Other Topics Concern     Not on file   Social History Narrative     Not on file     Social Determinants of Health     Financial Resource Strain: Not on file   Food Insecurity: Not on file "   Transportation Needs: Not on file   Physical Activity: Not on file   Stress: Not on file   Social Connections: Not on file   Intimate Partner Violence: Not on file   Housing Stability: Not on file       PMSH   [unfilled]  No past surgical history on file.    RESULTS/CONSULTS (Lab/Rad)   No results found for this or any previous visit (from the past 168 hour(s)).  CT Chest w Contrast  See Historical Hospital Medical Record for documentation     [unfilled]    HEALTH MAINTENANCE / SCREENING   [unfilled], [unfilled],[unfilled]  Immunization History   Administered Date(s) Administered     COVID-19,PF,Kalyn 05/21/2021     COVID-19,PF,Pfizer 12+ Yrs (2022 and After) 04/05/2022     HepB, Unspecified 04/03/2006, 07/30/2009, 03/09/2011     Influenza Vaccine, 6+MO IM (QUADRIVALENT W/PRESERVATIVES) 02/18/2015     MMR 04/03/2006     Pneumo Conj 13-V (2010&after) 03/01/2018     Pneumococcal 23 valent 11/19/2008     Td (Adult), Adsorbed 04/03/2006     Td,adult,historic,unspecified 05/28/2004, 01/01/2006     Tdap (Adacel,Boostrix) 03/09/2011     Varicella 05/28/2004, 04/03/2006     Health Maintenance   Topic     SPIROMETRY      ANNUAL REVIEW OF HM ORDERS      ADVANCE CARE PLANNING      COPD ACTION PLAN      COLORECTAL CANCER SCREENING      HIV SCREENING      HEPATITIS C SCREENING      ZOSTER IMMUNIZATION (1 of 2)     LUNG CANCER SCREENING      PREVENTIVE CARE VISIT      DTAP/TDAP/TD IMMUNIZATION (2 - Td or Tdap)     COVID-19 Vaccine (2 - Booster for Kalyn series)     INFLUENZA VACCINE (1)     LIPID      Pneumococcal Vaccine: Pediatrics (0 to 5 Years) and At-Risk Patients (6 to 64 Years) (2 of 2 - PPSV23)     PHQ-2 (once per calendar year)      HEPATITIS B IMMUNIZATION      IPV IMMUNIZATION      MENINGITIS IMMUNIZATION      Review of external notes as documented elsewhere in note  Diagnosis or treatment significantly limited by social determinants of health - Language barrier, low social economic.  25  minutes spent on the date of the encounter doing chart review, review of outside records, review of test results, interpretation of tests, patient visit and documentation          Colten Avila MD  [facility]   This transcription uses voice recognition software, which may contain typographical errors.  Lung Cancer Screening Shared Decision Making Visit     Jonnie Latham is eligible for lung cancer screening on the basis of the information provided in my signed lung cancer screening order.     I have discussed with patient the risks and benefits of screening for lung cancer with low-dose CT.     The risks include:  radiation exposure: one low dose chest CT has as much ionizing radiation as about 15 chest x-rays or 6 months of background radiation living in [place]    false positives: 96% of positive findings/nodules are NOT cancer, but some might still require additional diagnostic evaluation, including biopsy  over-diagnosis: some slow growing cancers that might never have been clinically significant will be detected and treated unnecessarily     The benefit of early detection of lung cancer is contingent upon adherence to annual screening or more frequent follow up if indicated.     Furthermore, reaping the benefits of screening requires Jonnie Latham to be willing and physically able to undergo diagnostic procedures, if indicated. Although no specific guide is available for determining severity of comorbidities, it is reasonable to withhold screening in patients who have greater mortality risk from other diseases.     We did discuss that the only way to prevent lung cancer is to not smoke. Smoking cessation counseling was given, duration 3-10 minutes.      I did  offer risk estimation using a calculator such as this one:    ShouldIScreen

## 2022-04-07 NOTE — PATIENT INSTRUCTIONS
Lung Cancer Screening   Frequently Asked Questions  If you are at high-risk for lung cancer, getting screened with low-dose computed tomography (LDCT) every year can help save your life. This handout offers answers to some of the most common questions about lung cancer screening. If you have other questions, please call [phone] ([phone]).     What is it?  Lung cancer screening uses special X-ray technology to create an image of your lung tissue. The exam is quick and easy and takes less than 10 seconds. We don t give you any medicine or use any needles. You can eat before and after the exam. You don t need to change your clothes as long as the clothing on your chest doesn t contain metal. But, you do need to be able to hold your breath for at least 6 seconds during the exam.    What is the goal of lung cancer screening?  The goal of lung cancer screening is to save lives. Many times, lung cancer is not found until a person starts having physical symptoms. Lung cancer screening can help detect lung cancer in the earliest stages when it may be easier to treat.    Who should be screened for lung cancer?  We suggest lung cancer screening for anyone who is at high-risk for lung cancer. You are in the high-risk group if you:      are between the ages of 55 and 79, and    have smoked at least 1 pack of cigarettes a day for 20 or more years, and    still smoke or have quit within the past 15 years.    However, if you have a new cough or shortness of breath, you should talk to your doctor before being screened.    Why does it matter if I have symptoms?  Certain symptoms can be a sign that you have a condition in your lungs that should be checked and treated by your doctor. These symptoms include fever, chest pain, a new or changing cough, shortness of breath that you have never felt before, coughing up blood or unexplained weight loss. Having any of these symptoms can greatly affect the results of lung  cancer screening.       Should all smokers get an LDCT lung cancer screening exam?  It depends. Lung cancer screening is for a very specific group of men and women who have a history of heavy smoking over a long period of time (see  Who should be screened for lung cancer  above).  I am in the high-risk group, but have been diagnosed with cancer in the past. Is LDCT lung cancer screening right for me?  In some cases, you should not have LDCT lung screening, such as when your doctor is already following your cancer with CT scan studies. Your doctor will help you decide if LDCT lung screening is right for you.  Do I need to have a screening exam every year?  Yes. If you are in the high-risk group described earlier, you should get an LDCT lung cancer screening exam every year until you are 79, or are no longer willing or able to undergo screening and possible procedures to diagnose and treat lung cancer.  How effective is LDCT at preventing death from lung cancer?  Studies have shown that LDCT lung cancer screening can lower the risk of death from lung cancer by 20 percent in people who are at high-risk.  What are the risks?  There are some risks and limitations of LDCT lung cancer screening. We want to make sure you understand the risks and benefits, so please let us know if you have any questions. Your doctor may want to talk with you more about these risks.    Radiation exposure: As with any exam that uses radiation, there is a very small increased risk of cancer. The amount of radiation in LDCT is small--about the same amount a person would get from a mammogram. Your doctor orders the exam when he or she feels the potential benefits outweigh the risks.    False negatives: No test is perfect, including LDCT. It is possible that you may have a medical condition, including lung cancer, that is not found during your exam. This is called a false negative result.    False positives and more testing: LDCT very often finds  something in the lung that could be cancer, but in fact is not. This is called a false positive result. False positive tests often cause anxiety. To make sure these findings are not cancer, you may need to have more tests. These tests will be done only if you give us permission. Sometimes patients need a treatment that can have side effects, such as a biopsy. For more information on false positives, see  What can I expect from the results?     Findings not related to lung cancer: Your LDCT exam also takes pictures of areas of your body next to your lungs. In a very small number of cases, the CT scan will show an abnormal finding in one of these areas, such as your kidneys, adrenal glands, liver or thyroid. This finding may not be serious, but you may need more tests. Your doctor can help you decide what other tests you may need, if any.  What can I expect from the results?  About 1 out of 4 LDCT exams will find something that may need more tests. Most of the time, these findings are lung nodules. Lung nodules are very small collections of tissue in the lung. These nodules are very common, and the vast majority--more than 97 percent--are not cancer (benign). Most are normal lymph nodes or small areas of scarring from past infections.  But, if a small lung nodule is found to be cancer, the cancer can be cured more than 90 percent of the time. To know if the nodule is cancer, we may need to get more images before your next yearly screening exam. If the nodule has suspicious features (for example, it is large, has an odd shape or grows over time), we will refer you to a specialist for further testing.  Will my doctor also get the results?  Yes. Your doctor will get a copy of your results.  Is it okay to keep smoking now that there s a cancer screening exam?  No. Tobacco is one of the strongest cancer-causing agents. It causes not only lung cancer, but other cancers and cardiovascular (heart) diseases as well. The damage  caused by smoking builds over time. This means that the longer you smoke, the higher your risk of disease. While it is never too late to quit, the sooner you quit, the better.  Where can I find help to quit smoking?  The best way to prevent lung cancer is to stop smoking. If you have already quit smoking, congratulations and keep it up! For help on quitting smoking, please call [employer] at 9-722-QUITNOW ([phone]) or the [employer] at 1-895.348.3599 to find local resources near you.  One-on-one health coaching:  If you d prefer to work individually with a health care provider on tobacco cessation, we offer:      Medication Therapy Management:  Our specially trained pharmacists work closely with you and your doctor to help you quit smoking.  Call [phone] or [phone] (toll free).

## 2022-04-14 LAB — COLOGUARD INSUFFICIENT SPECIMEN: NORMAL

## 2022-06-06 ENCOUNTER — OFFICE VISIT (OUTPATIENT)
Dept: FAMILY MEDICINE | Facility: CLINIC | Age: 57
End: 2022-06-06
Payer: COMMERCIAL

## 2022-06-06 VITALS
HEIGHT: 60 IN | WEIGHT: 112 LBS | SYSTOLIC BLOOD PRESSURE: 136 MMHG | RESPIRATION RATE: 16 BRPM | OXYGEN SATURATION: 94 % | BODY MASS INDEX: 21.99 KG/M2 | TEMPERATURE: 97.7 F | DIASTOLIC BLOOD PRESSURE: 81 MMHG | HEART RATE: 61 BPM

## 2022-06-06 DIAGNOSIS — F33.9 DEPRESSION, RECURRENT (H): ICD-10-CM

## 2022-06-06 DIAGNOSIS — Z13.228 SCREENING FOR ENDOCRINE, METABOLIC AND IMMUNITY DISORDER: ICD-10-CM

## 2022-06-06 DIAGNOSIS — Z12.11 SCREEN FOR COLON CANCER: ICD-10-CM

## 2022-06-06 DIAGNOSIS — J43.9 PULMONARY EMPHYSEMA, UNSPECIFIED EMPHYSEMA TYPE (H): Primary | ICD-10-CM

## 2022-06-06 DIAGNOSIS — Z13.0 SCREENING FOR ENDOCRINE, METABOLIC AND IMMUNITY DISORDER: ICD-10-CM

## 2022-06-06 DIAGNOSIS — Z11.59 NEED FOR HEPATITIS C SCREENING TEST: ICD-10-CM

## 2022-06-06 DIAGNOSIS — J47.9 BRONCHIECTASIS WITHOUT COMPLICATION (H): ICD-10-CM

## 2022-06-06 DIAGNOSIS — Z13.29 SCREENING FOR ENDOCRINE, METABOLIC AND IMMUNITY DISORDER: ICD-10-CM

## 2022-06-06 DIAGNOSIS — Z11.4 SCREENING FOR HIV (HUMAN IMMUNODEFICIENCY VIRUS): ICD-10-CM

## 2022-06-06 LAB
ALBUMIN SERPL-MCNC: 3.3 G/DL (ref 3.5–5)
ALP SERPL-CCNC: 71 U/L (ref 45–120)
ALT SERPL W P-5'-P-CCNC: 40 U/L (ref 0–45)
ANION GAP SERPL CALCULATED.3IONS-SCNC: 9 MMOL/L (ref 5–18)
AST SERPL W P-5'-P-CCNC: 32 U/L (ref 0–40)
BILIRUB SERPL-MCNC: 0.3 MG/DL (ref 0–1)
BUN SERPL-MCNC: 19 MG/DL (ref 8–22)
CALCIUM SERPL-MCNC: 8.8 MG/DL (ref 8.5–10.5)
CHLORIDE BLD-SCNC: 104 MMOL/L (ref 98–107)
CO2 SERPL-SCNC: 28 MMOL/L (ref 22–31)
CREAT SERPL-MCNC: 1.04 MG/DL (ref 0.7–1.3)
ERYTHROCYTE [DISTWIDTH] IN BLOOD BY AUTOMATED COUNT: 13.9 % (ref 10–15)
GFR SERPL CREATININE-BSD FRML MDRD: 83 ML/MIN/1.73M2
GLUCOSE BLD-MCNC: 107 MG/DL (ref 70–125)
HBA1C MFR BLD: 5.5 % (ref 0–5.6)
HCT VFR BLD AUTO: 40.7 % (ref 40–53)
HCV AB SERPL QL IA: NONREACTIVE
HGB BLD-MCNC: 13.3 G/DL (ref 13.3–17.7)
HIV 1+2 AB+HIV1 P24 AG SERPL QL IA: NEGATIVE
MCH RBC QN AUTO: 30.5 PG (ref 26.5–33)
MCHC RBC AUTO-ENTMCNC: 32.7 G/DL (ref 31.5–36.5)
MCV RBC AUTO: 93 FL (ref 78–100)
PLATELET # BLD AUTO: 161 10E3/UL (ref 150–450)
POTASSIUM BLD-SCNC: 4 MMOL/L (ref 3.5–5)
PROT SERPL-MCNC: 7.6 G/DL (ref 6–8)
RBC # BLD AUTO: 4.36 10E6/UL (ref 4.4–5.9)
SODIUM SERPL-SCNC: 141 MMOL/L (ref 136–145)
WBC # BLD AUTO: 8.9 10E3/UL (ref 4–11)

## 2022-06-06 PROCEDURE — 80053 COMPREHEN METABOLIC PANEL: CPT | Performed by: FAMILY MEDICINE

## 2022-06-06 PROCEDURE — 83036 HEMOGLOBIN GLYCOSYLATED A1C: CPT | Performed by: FAMILY MEDICINE

## 2022-06-06 PROCEDURE — 86803 HEPATITIS C AB TEST: CPT | Performed by: FAMILY MEDICINE

## 2022-06-06 PROCEDURE — 87389 HIV-1 AG W/HIV-1&-2 AB AG IA: CPT | Performed by: FAMILY MEDICINE

## 2022-06-06 PROCEDURE — 36415 COLL VENOUS BLD VENIPUNCTURE: CPT | Performed by: FAMILY MEDICINE

## 2022-06-06 PROCEDURE — 85027 COMPLETE CBC AUTOMATED: CPT | Performed by: FAMILY MEDICINE

## 2022-06-06 PROCEDURE — 99213 OFFICE O/P EST LOW 20 MIN: CPT | Performed by: FAMILY MEDICINE

## 2022-06-06 RX ORDER — MOMETASONE FUROATE AND FORMOTEROL FUMARATE DIHYDRATE 100; 5 UG/1; UG/1
2 AEROSOL RESPIRATORY (INHALATION) EVERY 12 HOURS
Qty: 26 G | Refills: 3 | Status: SHIPPED | OUTPATIENT
Start: 2022-06-06

## 2022-06-06 RX ORDER — ALBUTEROL SULFATE 0.63 MG/3ML
1 SOLUTION RESPIRATORY (INHALATION) 4 TIMES DAILY PRN
Qty: 75 ML | Refills: 1 | Status: SHIPPED | OUTPATIENT
Start: 2022-06-06

## 2022-06-06 RX ORDER — IPRATROPIUM BROMIDE AND ALBUTEROL 20; 100 UG/1; UG/1
SPRAY, METERED RESPIRATORY (INHALATION)
Qty: 12 G | Refills: 3 | Status: SHIPPED | OUTPATIENT
Start: 2022-06-06

## 2022-06-06 NOTE — PROGRESS NOTES
Pt have CT appt on 6/18/22 with [facility] radiology. Completing task.

## 2022-06-06 NOTE — Clinical Note
A Care Transition RN will be following this patient at discharge for 14 days due to a positive COVID-19 diagnosis.     Please help him schedule CT lungs and pulmonary function test from last April. Thank you

## 2022-06-06 NOTE — PROGRESS NOTES
OFFICE VISIT - FAMILY MEDICINE     ASSESSMENT AND PLAN       ICD-10-CM    1. Pulmonary emphysema, unspecified emphysema type (H)  J43.9 Spirometry, Breathing Capacity, Normal Order, Clinic Performed     albuterol (ACCUNEB) 0.63 MG/3ML neb solution     ipratropium-albuterol (COMBIVENT RESPIMAT)  MCG/ACT inhaler     mometasone-formoterol (DULERA) 100-5 MCG/ACT inhaler   2. Screen for colon cancer  Z12.11 Fecal colorectal cancer screen (FIT)   3. Screening for HIV (human immunodeficiency virus)  Z11.4 HIV Antigen Antibody Combo     HIV Antigen Antibody Combo   4. Need for hepatitis C screening test  Z11.59 Hepatitis C Screen Reflex to HCV RNA Quant and Genotype     Hepatitis C Screen Reflex to HCV RNA Quant and Genotype   5. Bronchiectasis without complication (H)  J47.9 albuterol (ACCUNEB) 0.63 MG/3ML neb solution     ipratropium-albuterol (COMBIVENT RESPIMAT)  MCG/ACT inhaler     mometasone-formoterol (DULERA) 100-5 MCG/ACT inhaler   6. Screening for endocrine, metabolic and immunity disorder  Z13.29 Comprehensive metabolic panel (BMP + Alb, Alk Phos, ALT, AST, Total. Bili, TP)    Z13.228 CBC with platelets    Z13.0 Hemoglobin A1c     Comprehensive metabolic panel (BMP + Alb, Alk Phos, ALT, AST, Total. Bili, TP)     CBC with platelets     Hemoglobin A1c   7. Depression, recurrent (H)  F33.9    COPD, discussed with patient importance of healthy lifestyle changes, smoking cessation, will have her schedule a regular screening lung cancer on PFTs patient understand risk and benefit.  Screening colon cancer discussed, he did submit an empty Cologuard test, stating that insurance did not cover it, we discussed fit test as an option, new order signed.  Depression has been doing fine not currently taking any medication.  No suicidal.     CHIEF COMPLAINT   Recheck Medication (Need refill on inhaler)       HPI   Jonnie Latham is a 57 year old male.  No Patient Care Coordination Note on file.    Is following up on  "chronic medical issues, COPD, currently controlled with Dulera 100-5 MCG 2 puffs twice daily, Combivent Respimat twice daily, albuterol as needed nebulizer, overall doing fine has been cutting down on nicotine, has used nicotine patch in the past with mild improvement.  Currently smoking about 5 to 8 cigarettes/day.  Denies any cough or coughing up blood.  Has not had any PFTs and screening lung cancer done recently, we have spent some time discussing pros and cons.  Patient was referred for CT lungs, but this did not there was some issue with the  and he went there on a Saturday and there was no .  Has had some issue with depression in the past but currently doing fine, nonsuicidal.      Review of Systems As per HPI, otherwise negative.    OBJECTIVE   /81 (BP Location: Left arm, Patient Position: Sitting, Cuff Size: Adult Regular)   Pulse 61   Temp 97.7  F (36.5  C) (Temporal)   Resp 16   Ht 1.51 m (4' 11.45\")   Wt 50.8 kg (112 lb)   SpO2 94%   BMI 22.28 kg/m    Physical Exam  Constitutional:       Appearance: Normal appearance.   HENT:      Head: Normocephalic and atraumatic.   Cardiovascular:      Rate and Rhythm: Normal rate and regular rhythm.   Pulmonary:      Effort: Pulmonary effort is normal.      Breath sounds: Normal breath sounds.      Comments: Distant lung sounds bibasilarly  Musculoskeletal:      Cervical back: Normal range of motion and neck supple.   Neurological:      General: No focal deficit present.      Mental Status: He is alert and oriented to person, place, and time.   Psychiatric:         Behavior: Behavior normal.         Thought Content: Thought content normal.         Judgment: Judgment normal.         PFSH   No family history on file.  Social History     Socioeconomic History     Marital status:      Spouse name: Not on file     Number of children: Not on file     Years of education: Not on file     Highest education level: Not on file "   Occupational History     Not on file   Tobacco Use     Smoking status: Current Every Day Smoker     Types: Cigarettes     Smokeless tobacco: Never Used     Tobacco comment: 1-2 cigs per day   Substance and Sexual Activity     Alcohol use: Not on file     Drug use: Not on file     Sexual activity: Not on file   Other Topics Concern     Not on file   Social History Narrative     Not on file     Social Determinants of Health     Financial Resource Strain: Not on file   Food Insecurity: Not on file   Transportation Needs: Not on file   Physical Activity: Not on file   Stress: Not on file   Social Connections: Not on file   Intimate Partner Violence: Not on file   Housing Stability: Not on file       PMS   [unfilled]  No past surgical history on file.    RESULTS/CONSULTS (Lab/Rad)     Recent Results (from the past 168 hour(s))   CBC with platelets   Result Value Ref Range    WBC Count 8.9 4.0 - 11.0 10e3/uL    RBC Count 4.36 (L) 4.40 - 5.90 10e6/uL    Hemoglobin 13.3 13.3 - 17.7 g/dL    Hematocrit 40.7 40.0 - 53.0 %    MCV 93 78 - 100 fL    MCH 30.5 26.5 - 33.0 pg    MCHC 32.7 31.5 - 36.5 g/dL    RDW 13.9 10.0 - 15.0 %    Platelet Count 161 150 - 450 10e3/uL   Hemoglobin A1c   Result Value Ref Range    Hemoglobin A1C 5.5 0.0 - 5.6 %     CT Chest w Contrast  See Historical Hospital Medical Record for documentation     [unfilled]    HEALTH MAINTENANCE / SCREENING   [unfilled], [unfilled],[unfilled]  Immunization History   Administered Date(s) Administered     COVID-19,PF,Kalyn 05/21/2021     COVID-19,PF,Pfizer 12+ Yrs (2022 and After) 04/05/2022     HepB, Unspecified 04/03/2006, 07/30/2009, 03/09/2011     Influenza Vaccine, 6+MO IM (QUADRIVALENT W/PRESERVATIVES) 02/18/2015     MMR 04/03/2006     Pneumo Conj 13-V (2010&after) 03/01/2018     Pneumococcal 23 valent 11/19/2008     Td (Adult), Adsorbed 04/03/2006     Td,adult,historic,unspecified 05/28/2004, 01/01/2006     Tdap (Adacel,Boostrix)  03/09/2011     Varicella 05/28/2004, 04/03/2006     Health Maintenance   Topic     SPIROMETRY      ANNUAL REVIEW OF HM ORDERS      ADVANCE CARE PLANNING      COPD ACTION PLAN      COLORECTAL CANCER SCREENING      HIV SCREENING      HEPATITIS C SCREENING      ZOSTER IMMUNIZATION (1 of 2)     LUNG CANCER SCREENING      PREVENTIVE CARE VISIT      DTAP/TDAP/TD IMMUNIZATION (2 - Td or Tdap)     COVID-19 Vaccine (3 - Booster for Kalyn series)     INFLUENZA VACCINE (Season Ended)     LIPID      Pneumococcal Vaccine: Pediatrics (0 to 5 Years) and At-Risk Patients (6 to 64 Years) (3 - PPSV23 or PCV20)     PHQ-2 (once per calendar year)      HEPATITIS B IMMUNIZATION      IPV IMMUNIZATION      MENINGITIS IMMUNIZATION      Review of external notes as documented elsewhere in note  24 minutes spent on the date of the encounter doing chart review, review of outside records, review of test results, interpretation of tests, patient visit and documentation          Colten Avila MD  Family Medicine, [facility]   This transcription uses voice recognition software, which may contain typographical errors.

## 2022-06-06 NOTE — LETTER
June 8, 2022      Jonnie Latham  [address] [place] [address]        Dear ,    We are writing to inform you of your test results.    Your test results  remain unchanged from previous results.  Please continue with current treatment plan., Eat food rich in protein.    Resulted Orders   HIV Antigen Antibody Combo   Result Value Ref Range    HIV Antigen Antibody Combo Negative Negative   Hepatitis C Screen Reflex to HCV RNA Quant and Genotype   Result Value Ref Range    Hepatitis C Antibody Nonreactive Nonreactive    Narrative    Assay performance characteristics have not been established for newborns, infants, and children.   Comprehensive metabolic panel (BMP + Alb, Alk Phos, ALT, AST, Total. Bili, TP)   Result Value Ref Range    Sodium 141 136 - 145 mmol/L    Potassium 4.0 3.5 - 5.0 mmol/L    Chloride 104 98 - 107 mmol/L    Carbon Dioxide (CO2) 28 22 - 31 mmol/L    Anion Gap 9 5 - 18 mmol/L    Urea Nitrogen 19 8 - 22 mg/dL    Creatinine 1.04 0.70 - 1.30 mg/dL    Calcium 8.8 8.5 - 10.5 mg/dL    Glucose 107 70 - 125 mg/dL    Alkaline Phosphatase 71 45 - 120 U/L    AST 32 0 - 40 U/L    ALT 40 0 - 45 U/L    Protein Total 7.6 6.0 - 8.0 g/dL    Albumin 3.3 (L) 3.5 - 5.0 g/dL    Bilirubin Total 0.3 0.0 - 1.0 mg/dL    GFR Estimate 83 >60 mL/min/1.73m2      Comment:      Effective December 21, 2021 eGFRcr in adults is calculated using the 2021 CKD-EPI creatinine equation which includes age and gender (Kim et al., [facility], DOI: 10.1056/SJETvz4880959)   CBC with platelets   Result Value Ref Range    WBC Count 8.9 4.0 - 11.0 10e3/uL    RBC Count 4.36 (L) 4.40 - 5.90 10e6/uL    Hemoglobin 13.3 13.3 - 17.7 g/dL    Hematocrit 40.7 40.0 - 53.0 %    MCV 93 78 - 100 fL    MCH 30.5 26.5 - 33.0 pg    MCHC 32.7 31.5 - 36.5 g/dL    RDW 13.9 10.0 - 15.0 %    Platelet Count 161 150 - 450 10e3/uL   Hemoglobin A1c   Result Value Ref Range    Hemoglobin A1C 5.5 0.0 - 5.6 %      Comment:      Normal <5.7%   Prediabetes  5.7-6.4%    Diabetes 6.5% or higher     Note: Adopted from ADA consensus guidelines.       If you have any questions or concerns, please call the clinic at the number listed above.       Sincerely,      Colten Avila MD

## 2022-06-06 NOTE — LETTER
Shelby 10, 2022      Jonnie Latham  [address] [place] [address]        Dear ,    We are writing to inform you of your test results.    Albumin level was just mildly low, make sure that you eat enough protein.    Resulted Orders   HIV Antigen Antibody Combo   Result Value Ref Range    HIV Antigen Antibody Combo Negative Negative   Hepatitis C Screen Reflex to HCV RNA Quant and Genotype   Result Value Ref Range    Hepatitis C Antibody Nonreactive Nonreactive    Narrative    Assay performance characteristics have not been established for newborns, infants, and children.   Comprehensive metabolic panel (BMP + Alb, Alk Phos, ALT, AST, Total. Bili, TP)   Result Value Ref Range    Sodium 141 136 - 145 mmol/L    Potassium 4.0 3.5 - 5.0 mmol/L    Chloride 104 98 - 107 mmol/L    Carbon Dioxide (CO2) 28 22 - 31 mmol/L    Anion Gap 9 5 - 18 mmol/L    Urea Nitrogen 19 8 - 22 mg/dL    Creatinine 1.04 0.70 - 1.30 mg/dL    Calcium 8.8 8.5 - 10.5 mg/dL    Glucose 107 70 - 125 mg/dL    Alkaline Phosphatase 71 45 - 120 U/L    AST 32 0 - 40 U/L    ALT 40 0 - 45 U/L    Protein Total 7.6 6.0 - 8.0 g/dL    Albumin 3.3 (L) 3.5 - 5.0 g/dL    Bilirubin Total 0.3 0.0 - 1.0 mg/dL    GFR Estimate 83 >60 mL/min/1.73m2      Comment:      Effective December 21, 2021 eGFRcr in adults is calculated using the 2021 CKD-EPI creatinine equation which includes age and gender (Kim et al., [place], DOI: 10.1056/AREYyq5362575)   CBC with platelets   Result Value Ref Range    WBC Count 8.9 4.0 - 11.0 10e3/uL    RBC Count 4.36 (L) 4.40 - 5.90 10e6/uL    Hemoglobin 13.3 13.3 - 17.7 g/dL    Hematocrit 40.7 40.0 - 53.0 %    MCV 93 78 - 100 fL    MCH 30.5 26.5 - 33.0 pg    MCHC 32.7 31.5 - 36.5 g/dL    RDW 13.9 10.0 - 15.0 %    Platelet Count 161 150 - 450 10e3/uL   Hemoglobin A1c   Result Value Ref Range    Hemoglobin A1C 5.5 0.0 - 5.6 %      Comment:      Normal <5.7%   Prediabetes 5.7-6.4%    Diabetes 6.5% or higher     Note: Adopted from ADA  consensus guidelines.       If you have any questions or concerns, please call the clinic at the number listed above.       Sincerely,      Colten Avila MD

## 2022-06-18 ENCOUNTER — HOSPITAL ENCOUNTER (OUTPATIENT)
Dept: CT IMAGING | Facility: HOSPITAL | Age: 57
Discharge: HOME | End: 2022-06-18
Attending: FAMILY MEDICINE | Admitting: FAMILY MEDICINE
Payer: COMMERCIAL

## 2022-06-18 DIAGNOSIS — Z87.891 PERSONAL HISTORY OF TOBACCO USE: ICD-10-CM

## 2022-06-18 PROCEDURE — 71271 CT THORAX LUNG CANCER SCR C-: CPT

## 2023-11-09 ENCOUNTER — LAB REQUISITION (OUTPATIENT)
Dept: LAB | Facility: CLINIC | Age: 58
End: 2023-11-09

## 2023-11-09 DIAGNOSIS — Z13.1 ENCOUNTER FOR SCREENING FOR DIABETES MELLITUS: ICD-10-CM

## 2023-11-09 DIAGNOSIS — Z13.220 ENCOUNTER FOR SCREENING FOR LIPOID DISORDERS: ICD-10-CM

## 2023-11-09 DIAGNOSIS — Z12.5 ENCOUNTER FOR SCREENING FOR MALIGNANT NEOPLASM OF PROSTATE: ICD-10-CM

## 2023-11-09 LAB
ANION GAP SERPL CALCULATED.3IONS-SCNC: 11 MMOL/L (ref 7–15)
BUN SERPL-MCNC: 18.1 MG/DL (ref 8–23)
CALCIUM SERPL-MCNC: 8.8 MG/DL (ref 8.8–10.2)
CHLORIDE SERPL-SCNC: 105 MMOL/L (ref 98–107)
CHOLEST SERPL-MCNC: 161 MG/DL
CREAT SERPL-MCNC: 0.81 MG/DL (ref 0.67–1.17)
DEPRECATED HCO3 PLAS-SCNC: 27 MMOL/L (ref 22–29)
EGFRCR SERPLBLD CKD-EPI 2021: >90 ML/MIN/1.73M2
GLUCOSE SERPL-MCNC: 113 MG/DL (ref 70–99)
HDLC SERPL-MCNC: 33 MG/DL
LDLC SERPL CALC-MCNC: 94 MG/DL
NONHDLC SERPL-MCNC: 128 MG/DL
POTASSIUM SERPL-SCNC: 3.9 MMOL/L (ref 3.4–5.3)
PSA SERPL DL<=0.01 NG/ML-MCNC: 0.15 NG/ML (ref 0–4.5)
SODIUM SERPL-SCNC: 143 MMOL/L (ref 135–145)
TRIGL SERPL-MCNC: 168 MG/DL

## 2023-11-09 PROCEDURE — G0103 PSA SCREENING: HCPCS | Performed by: PHYSICIAN ASSISTANT

## 2023-11-09 PROCEDURE — 80048 BASIC METABOLIC PNL TOTAL CA: CPT | Performed by: PHYSICIAN ASSISTANT

## 2023-11-09 PROCEDURE — 80061 LIPID PANEL: CPT | Performed by: PHYSICIAN ASSISTANT

## 2024-01-18 ENCOUNTER — LAB REQUISITION (OUTPATIENT)
Dept: LAB | Facility: CLINIC | Age: 59
End: 2024-01-18

## 2024-01-18 DIAGNOSIS — J47.9 BRONCHIECTASIS, UNCOMPLICATED (H): ICD-10-CM

## 2024-01-19 PROCEDURE — 86481 TB AG RESPONSE T-CELL SUSP: CPT | Performed by: PHYSICIAN ASSISTANT

## 2024-01-21 LAB
GAMMA INTERFERON BACKGROUND BLD IA-ACNC: 0.06 IU/ML
M TB IFN-G BLD-IMP: NEGATIVE
M TB IFN-G CD4+ BCKGRND COR BLD-ACNC: 4.79 IU/ML
MITOGEN IGNF BCKGRD COR BLD-ACNC: 0.01 IU/ML
MITOGEN IGNF BCKGRD COR BLD-ACNC: 0.04 IU/ML
QUANTIFERON MITOGEN: 4.85 IU/ML
QUANTIFERON NIL TUBE: 0.06 IU/ML
QUANTIFERON TB1 TUBE: 0.07 IU/ML
QUANTIFERON TB2 TUBE: 0.1

## 2024-02-01 ENCOUNTER — LAB REQUISITION (OUTPATIENT)
Dept: LAB | Facility: CLINIC | Age: 59
End: 2024-02-01

## 2024-02-01 DIAGNOSIS — J47.9 BRONCHIECTASIS, UNCOMPLICATED (H): ICD-10-CM

## 2024-02-05 PROCEDURE — 86481 TB AG RESPONSE T-CELL SUSP: CPT | Performed by: PHYSICIAN ASSISTANT

## 2024-02-07 LAB
GAMMA INTERFERON BACKGROUND BLD IA-ACNC: 0.08 IU/ML
M TB IFN-G BLD-IMP: NEGATIVE
M TB IFN-G CD4+ BCKGRND COR BLD-ACNC: 9.92 IU/ML
MITOGEN IGNF BCKGRD COR BLD-ACNC: 0.04 IU/ML
MITOGEN IGNF BCKGRD COR BLD-ACNC: 0.2 IU/ML
QUANTIFERON MITOGEN: 10 IU/ML
QUANTIFERON NIL TUBE: 0.08 IU/ML
QUANTIFERON TB1 TUBE: 0.28 IU/ML
QUANTIFERON TB2 TUBE: 0.12

## 2025-02-06 ENCOUNTER — LAB REQUISITION (OUTPATIENT)
Dept: LAB | Facility: CLINIC | Age: 60
End: 2025-02-06

## 2025-02-06 DIAGNOSIS — Z13.220 ENCOUNTER FOR SCREENING FOR LIPOID DISORDERS: ICD-10-CM

## 2025-02-06 DIAGNOSIS — Z12.5 ENCOUNTER FOR SCREENING FOR MALIGNANT NEOPLASM OF PROSTATE: ICD-10-CM

## 2025-02-06 DIAGNOSIS — R73.09 OTHER ABNORMAL GLUCOSE: ICD-10-CM

## 2025-02-06 LAB
ANION GAP SERPL CALCULATED.3IONS-SCNC: 11 MMOL/L (ref 7–15)
BUN SERPL-MCNC: 12.6 MG/DL (ref 8–23)
CALCIUM SERPL-MCNC: 9.4 MG/DL (ref 8.8–10.4)
CHLORIDE SERPL-SCNC: 102 MMOL/L (ref 98–107)
CHOLEST SERPL-MCNC: 165 MG/DL
CREAT SERPL-MCNC: 0.89 MG/DL (ref 0.67–1.17)
EGFRCR SERPLBLD CKD-EPI 2021: >90 ML/MIN/1.73M2
FASTING STATUS PATIENT QL REPORTED: ABNORMAL
FASTING STATUS PATIENT QL REPORTED: ABNORMAL
GLUCOSE SERPL-MCNC: 105 MG/DL (ref 70–99)
HCO3 SERPL-SCNC: 27 MMOL/L (ref 22–29)
HDLC SERPL-MCNC: 35 MG/DL
LDLC SERPL CALC-MCNC: 104 MG/DL
NONHDLC SERPL-MCNC: 130 MG/DL
POTASSIUM SERPL-SCNC: 4.4 MMOL/L (ref 3.4–5.3)
PSA SERPL DL<=0.01 NG/ML-MCNC: 0.12 NG/ML (ref 0–4.5)
SODIUM SERPL-SCNC: 140 MMOL/L (ref 135–145)
TRIGL SERPL-MCNC: 131 MG/DL

## 2025-02-06 PROCEDURE — 82435 ASSAY OF BLOOD CHLORIDE: CPT | Performed by: PHYSICIAN ASSISTANT

## 2025-02-06 PROCEDURE — 80048 BASIC METABOLIC PNL TOTAL CA: CPT | Performed by: PHYSICIAN ASSISTANT

## 2025-02-06 PROCEDURE — 80061 LIPID PANEL: CPT | Performed by: PHYSICIAN ASSISTANT

## 2025-02-06 PROCEDURE — G0103 PSA SCREENING: HCPCS | Performed by: PHYSICIAN ASSISTANT

## 2025-02-06 PROCEDURE — 84295 ASSAY OF SERUM SODIUM: CPT | Performed by: PHYSICIAN ASSISTANT
